# Patient Record
Sex: FEMALE | Race: WHITE
[De-identification: names, ages, dates, MRNs, and addresses within clinical notes are randomized per-mention and may not be internally consistent; named-entity substitution may affect disease eponyms.]

---

## 2017-05-11 ENCOUNTER — HOSPITAL ENCOUNTER (OUTPATIENT)
Dept: HOSPITAL 76 - DI.S | Age: 72
Discharge: HOME | End: 2017-05-11
Attending: INTERNAL MEDICINE
Payer: MEDICARE

## 2017-05-11 DIAGNOSIS — M19.072: ICD-10-CM

## 2017-05-11 DIAGNOSIS — M16.11: Primary | ICD-10-CM

## 2017-05-11 NOTE — XRAY REPORT
THREE VIEW LEFT FOOT: 05/11/2017

 

CLINICAL INDICATION: Pain. 

 

FINDINGS:  AP, lateral, and oblique views of the left foot demonstrate mild osteoarthritis of the 1st
 metatarsophalangeal joint. A small plantar calcaneal spur is present. There is no evidence of acute 
fracture or dislocation. 

 

IMPRESSION:  MILD DEGENERATIVE CHANGES. NO EVIDENCE OF FRACTURE.

 

 

 

DD:05/11/2017 12:41:33  DT: 05/11/2017 12:49  JOB #: [id number]  EXT JOB #:[id number]

## 2017-05-11 NOTE — XRAY REPORT
RIGHT HIP AND PELVIS: 05/11/2017

 

CLINICAL INDICATION: Pain. 

 

FINDINGS:  Frontal view of the hips and pelvis and frogleg lateral view of the right hip demonstrate 
postoperative changes in the soft tissues. There is no evidence of acute fracture or dislocation. Mil
d right hip osteoarthritis is noted. 

 

IMPRESSION:  MILD OSTEOARTHRITIS.

 

 

 

DD:05/11/2017 12:34:06  DT: 05/11/2017 12:41  JOB #: [id number]  EXT JOB #:[id number]

## 2022-11-01 ENCOUNTER — HOSPITAL ENCOUNTER (OUTPATIENT)
Dept: HOSPITAL 76 - DI | Age: 77
Discharge: HOME | End: 2022-11-01
Attending: NURSE PRACTITIONER
Payer: MEDICARE

## 2022-11-01 DIAGNOSIS — I77.810: Primary | ICD-10-CM

## 2022-11-01 PROCEDURE — 93306 TTE W/DOPPLER COMPLETE: CPT
